# Patient Record
Sex: FEMALE | URBAN - METROPOLITAN AREA
[De-identification: names, ages, dates, MRNs, and addresses within clinical notes are randomized per-mention and may not be internally consistent; named-entity substitution may affect disease eponyms.]

---

## 2019-04-05 ENCOUNTER — TELEPHONE (OUTPATIENT)
Dept: ADMINISTRATIVE | Age: 54
End: 2019-04-05

## 2019-04-05 NOTE — TELEPHONE ENCOUNTER
Patient called wanting to schedule with Dr Liv Bobo. She states she has a broken left arm and just left [facility]. Her info was not in Epic so I had to enter her name and address. I explained she needed a referral because she went to [facility] and she got upset. I told her I would check with the office. Called the office and spoke with Kelsey Ham and he took the call.